# Patient Record
Sex: MALE | Race: WHITE
[De-identification: names, ages, dates, MRNs, and addresses within clinical notes are randomized per-mention and may not be internally consistent; named-entity substitution may affect disease eponyms.]

---

## 2018-12-25 ENCOUNTER — HOSPITAL ENCOUNTER (EMERGENCY)
Dept: HOSPITAL 61 - ER | Age: 48
Discharge: HOME | End: 2018-12-25
Payer: COMMERCIAL

## 2018-12-25 VITALS — SYSTOLIC BLOOD PRESSURE: 130 MMHG | DIASTOLIC BLOOD PRESSURE: 87 MMHG

## 2018-12-25 VITALS — HEIGHT: 67 IN | BODY MASS INDEX: 29.82 KG/M2 | WEIGHT: 190 LBS

## 2018-12-25 DIAGNOSIS — K21.9: ICD-10-CM

## 2018-12-25 DIAGNOSIS — J09.X2: Primary | ICD-10-CM

## 2018-12-25 LAB
INFLUENZA A PATIENT: POSITIVE
INFLUENZA B PATIENT: NEGATIVE

## 2018-12-25 PROCEDURE — 71111 X-RAY EXAM RIBS/CHEST4/> VWS: CPT

## 2018-12-25 PROCEDURE — 87804 INFLUENZA ASSAY W/OPTIC: CPT

## 2018-12-25 NOTE — RAD
Exam:Bilateral ribs with PA chest  

 

Date: 12/25/2018 6:53 PM

 

Comparison: No prior

 

Indication:  COUGH AND FEVER STARTED YESTERDAY, RIB PAIN FROM COUGHING

 

Findings/

Impression:

The heart is not enlarged. Mediastinal and hilar contours are normal. No 

focal parenchymal airspace opacity. No pleural effusion or pneumothorax.

 

AP, Oblique and Spot images of   the ribs   are negative for acute 

displaced rib fracture. Negative focal pleural elevation. Symmetrical 

intercostal spacing. 

 

It is of note that an acute non-displaced rib fracture can be in-apparent 

on initial post-trauma imaging. 

 

Electronically signed by: Addi Jamison MD (12/25/2018 7:34 PM) [facility]

## 2018-12-26 NOTE — PHYS DOC
Past Medical History


Past Medical History:  GERD


Past Surgical History:  No Surgical History


Alcohol Use:  Occasionally


Drug Use:  None





Adult General


Chief Complaint


Chief Complaint:  COUGH





HPI


HPI





Patient is a 48  year old male who presents with flu-like symptoms.  Patient 

has been ill over the last 24-48 hours. He states his symptoms started 

suddenly. He has what he describes to be severe diffuse body aches. He has also 

had a persistent severe cough that has kept him up all night over the last 2 

nights. The cough has been dry and hacking in nature and nonproductive. He has 

had chills at home. He has used some over-the-counter pain medications without 

relief. No nausea or vomiting. Denies prior history of similar symptoms or 

other chronic health conditions.





Review of Systems


Review of Systems





Constitutional: + chills


Eyes: Denies change in visual acuity


HENT: Denies nasal congestion or sore throat 


Respiratory: + cough


Cardiovascular: No additional information not addressed in HPI 


GI: Denies abdominal pain, nausea or vomiting


Musculoskeletal: Denies back pain or joint pain 


Integument: Denies rash or skin lesions 


Neurologic: Denies headache





All other systems were reviewed and found to be within normal limits, except as 

documented in this note.





Current Medications


Current Medications





Current Medications








 Medications


  (Trade)  Dose


 Ordered  Sig/Dominic  Start Time


 Stop Time Status Last Admin


Dose Admin


 


 Acetaminophen/


 Hydrocodone Bitart


  (Lortab 5/325)  1 tab  STK-MED ONCE  12/25/18 20:15


 12/25/18 20:17 DC  














Allergies


Allergies





Allergies








Coded Allergies Type Severity Reaction Last Updated Verified


 


  No Known Drug Allergies    12/19/16 No











Physical Exam


Physical Exam





Constitutional: Well developed, well nourished, no acute distress


HENT: Normocephalic, atraumatic, bilateral external ears normal, oropharynx 

moist, TM's dull but not erythematous


Eyes: PERRLA, EOMI, conjunctiva normal, no discharge 


Neck: Normal range of motion, no tenderness, supple, no stridor.  


Cardiovascular:Heart rate regular rhythm, no murmur 


Lungs & Thorax:  Bilateral breath sounds clear to auscultation  


Skin: Warm, dry, no erythema, no rash   


Neurologic: Alert and oriented X 3


Psychologic: Affect normal





Current Patient Data


Vital Signs





 Vital Signs








  Date Time  Temp Pulse Resp B/P (MAP) Pulse Ox O2 Delivery O2 Flow Rate FiO2


 


12/25/18 17:52 98.3 88 18 130/87 (101) 97 Room Air  





 98.3       








Lab Values





 Laboratory Tests








Test


 12/25/18


18:00


 


Influenza Type A Antigen


 Positive


(NEGATIVE)


 


Influenza Type B Antigen


 Negative


(NEGATIVE)











EKG


EKG


[]





Radiology/Procedures


Radiology/Procedures


Rib xrays:  no acute findings





Course & Med Decision Making


Course & Med Decision Making


Pertinent Labs and Imaging studies reviewed. (See chart for details)





Patient was evaluated in the emergency department for flulike symptoms. He was 

screened for influenza and did test positive. He had normal vital signs during 

the ED course. His primary complaint was cough and body aches. He had tried 

some over-the-counter medications but to no avail to relieve his cough. He was 

requesting some pain medication and medicine for cough. In the ER, the patient 

was given 2 [place] for pain. He was discharged home with a prescription for 

Tessalon Perles. He was also given some hydrocodone elixir to use as needed for 

cough or discomfort at home. He was advised to push fluids and rest at home. 

Return to the ER for any new or worsening symptoms. Otherwise, follow up with 

primary care doctor.\





Patient did complain of lateral rib cage pain due to all of the coughing he had 

been having. X-rays were completed but no acute fractures were seen.





Dragon Disclaimer


Dragon Disclaimer


This electronic medical record was generated, in whole or in part, using a 

voice recognition dictation system.





Departure


Departure


Impression:  


 Primary Impression:  


 Influenza


Disposition:  01 HOME, SELF-CARE


Condition:  GOOD


Patient Instructions:  Influenza, Adult


Scripts


Benzonatate (TESSALON PERLE) 100 Mg Capsule


1 CAP PO TID PRN for COUGH, #21 CAP


   Prov: MOSHE LEVY DO         12/25/18 


Hydrocodone Bit/Acetaminophen (HYDROCODONE-APAP 7.5-325/15 SOLN  **) 15 Ml 

Solution


15 ML PO PRN Q6HRS for cough or pain, #120 ML 0 Refills


   Prov: MOSHE LEVY DO         12/25/18 


Ibuprofen (IBUPROFEN) 800 Mg Tablet


800 MG PO PRN TID PRN for PAIN, #21 TAB


   take with food or milk to avoid upsetting stomach


   Prov: MOSHE LEVY DO         12/25/18











MOSHE LEVY DO Dec 26, 2018 03:27